# Patient Record
Sex: MALE | Race: BLACK OR AFRICAN AMERICAN | NOT HISPANIC OR LATINO | Employment: OTHER | URBAN - METROPOLITAN AREA
[De-identification: names, ages, dates, MRNs, and addresses within clinical notes are randomized per-mention and may not be internally consistent; named-entity substitution may affect disease eponyms.]

---

## 2017-10-24 ENCOUNTER — TRANSCRIBE ORDERS (OUTPATIENT)
Dept: ADMINISTRATIVE | Facility: HOSPITAL | Age: 67
End: 2017-10-24

## 2017-10-24 ENCOUNTER — GENERIC CONVERSION - ENCOUNTER (OUTPATIENT)
Dept: OTHER | Facility: OTHER | Age: 67
End: 2017-10-24

## 2017-10-24 DIAGNOSIS — N28.89 OTHER SPECIFIED DISORDERS OF KIDNEY AND URETER: ICD-10-CM

## 2017-10-24 DIAGNOSIS — R31.0 GROSS HEMATURIA: ICD-10-CM

## 2017-10-24 DIAGNOSIS — R10.9 ABDOMINAL PAIN: ICD-10-CM

## 2017-10-24 DIAGNOSIS — R10.9 STOMACH ACHE: Primary | ICD-10-CM

## 2017-10-30 ENCOUNTER — HOSPITAL ENCOUNTER (OUTPATIENT)
Dept: CT IMAGING | Facility: HOSPITAL | Age: 67
Discharge: HOME | End: 2017-10-30
Attending: UROLOGY
Payer: COMMERCIAL

## 2017-10-30 DIAGNOSIS — R31.0 GROSS HEMATURIA: ICD-10-CM

## 2017-10-30 DIAGNOSIS — R10.9 ABDOMINAL PAIN: ICD-10-CM

## 2017-10-30 PROCEDURE — 74176 CT ABD & PELVIS W/O CONTRAST: CPT

## 2017-11-07 ENCOUNTER — HOSPITAL ENCOUNTER (OUTPATIENT)
Dept: ULTRASOUND IMAGING | Facility: HOSPITAL | Age: 67
Discharge: HOME | End: 2017-11-07
Attending: UROLOGY
Payer: COMMERCIAL

## 2017-11-07 DIAGNOSIS — N28.89 OTHER SPECIFIED DISORDERS OF KIDNEY AND URETER: ICD-10-CM

## 2017-11-07 PROCEDURE — 76770 US EXAM ABDO BACK WALL COMP: CPT

## 2018-01-22 VITALS
DIASTOLIC BLOOD PRESSURE: 80 MMHG | BODY MASS INDEX: 32.73 KG/M2 | HEIGHT: 69 IN | WEIGHT: 221 LBS | SYSTOLIC BLOOD PRESSURE: 120 MMHG

## 2018-06-25 ENCOUNTER — TELEPHONE (OUTPATIENT)
Dept: UROLOGY | Facility: MEDICAL CENTER | Age: 68
End: 2018-06-25

## 2018-06-25 DIAGNOSIS — N13.8 ENLARGED PROSTATE WITH URINARY OBSTRUCTION: Primary | ICD-10-CM

## 2018-06-25 DIAGNOSIS — N40.1 ENLARGED PROSTATE WITH URINARY OBSTRUCTION: Primary | ICD-10-CM

## 2018-06-26 RX ORDER — FEXOFENADINE HCL 180 MG/1
TABLET ORAL
Refills: 3 | COMMUNITY
Start: 2018-05-28

## 2018-06-26 RX ORDER — PANTOPRAZOLE SODIUM 40 MG/1
40 TABLET, DELAYED RELEASE ORAL 2 TIMES DAILY
Refills: 1 | COMMUNITY
Start: 2018-04-12

## 2018-06-26 RX ORDER — ATORVASTATIN CALCIUM 20 MG/1
20 TABLET, FILM COATED ORAL 3 TIMES WEEKLY
Refills: 1 | COMMUNITY
Start: 2018-05-11

## 2018-06-26 RX ORDER — GABAPENTIN 300 MG/1
300 CAPSULE ORAL
COMMUNITY

## 2018-06-26 RX ORDER — CITALOPRAM 10 MG/1
10 TABLET ORAL DAILY
Refills: 0 | COMMUNITY
Start: 2018-06-16

## 2018-06-26 RX ORDER — ERGOCALCIFEROL (VITAMIN D2) 10 MCG
1 TABLET ORAL DAILY
COMMUNITY

## 2018-06-26 RX ORDER — CLOPIDOGREL BISULFATE 75 MG/1
TABLET ORAL
COMMUNITY
Start: 2017-08-22

## 2018-06-26 RX ORDER — ALBUTEROL SULFATE 2.5 MG/3ML
SOLUTION RESPIRATORY (INHALATION)
Refills: 11 | COMMUNITY
Start: 2018-03-19

## 2018-06-26 RX ORDER — VALSARTAN 80 MG
80 TABLET ORAL DAILY
Refills: 0 | COMMUNITY
Start: 2018-06-06

## 2018-06-26 RX ORDER — ZOLPIDEM TARTRATE 5 MG/1
TABLET ORAL
Refills: 1 | COMMUNITY
Start: 2018-05-22

## 2018-06-26 RX ORDER — TRAMADOL HYDROCHLORIDE 50 MG/1
TABLET ORAL
Refills: 2 | COMMUNITY
Start: 2018-04-09

## 2018-06-28 ENCOUNTER — OFFICE VISIT (OUTPATIENT)
Dept: UROLOGY | Facility: MEDICAL CENTER | Age: 68
End: 2018-06-28
Payer: COMMERCIAL

## 2018-06-28 VITALS
WEIGHT: 214 LBS | DIASTOLIC BLOOD PRESSURE: 80 MMHG | SYSTOLIC BLOOD PRESSURE: 140 MMHG | HEIGHT: 69 IN | BODY MASS INDEX: 31.7 KG/M2

## 2018-06-28 DIAGNOSIS — N13.8 ENLARGED PROSTATE WITH URINARY OBSTRUCTION: Primary | ICD-10-CM

## 2018-06-28 DIAGNOSIS — Z12.5 ENCOUNTER FOR PROSTATE CANCER SCREENING: ICD-10-CM

## 2018-06-28 DIAGNOSIS — N40.1 ENLARGED PROSTATE WITH URINARY OBSTRUCTION: Primary | ICD-10-CM

## 2018-06-28 DIAGNOSIS — N52.2 DRUG-INDUCED ERECTILE DYSFUNCTION: ICD-10-CM

## 2018-06-28 LAB
SL AMB  POCT GLUCOSE, UA: NEGATIVE
SL AMB LEUKOCYTE ESTERASE,UA: NEGATIVE
SL AMB POCT BILIRUBIN,UA: NEGATIVE
SL AMB POCT BLOOD,UA: NEGATIVE
SL AMB POCT CLARITY,UA: CLEAR
SL AMB POCT COLOR,UA: YELLOW
SL AMB POCT KETONES,UA: NEGATIVE
SL AMB POCT NITRITE,UA: NEGATIVE
SL AMB POCT PH,UA: 5.5
SL AMB POCT SPECIFIC GRAVITY,UA: 1.02
SL AMB POCT URINE PROTEIN: NEGATIVE
SL AMB POCT UROBILINOGEN: 0.2

## 2018-06-28 PROCEDURE — 81003 URINALYSIS AUTO W/O SCOPE: CPT | Performed by: UROLOGY

## 2018-06-28 PROCEDURE — 99213 OFFICE O/P EST LOW 20 MIN: CPT | Performed by: UROLOGY

## 2018-06-28 RX ORDER — SILDENAFIL CITRATE 20 MG/1
20 TABLET ORAL DAILY PRN
Qty: 90 TABLET | Refills: 3 | Status: SHIPPED | OUTPATIENT
Start: 2018-06-28

## 2018-06-28 RX ORDER — FLUTICASONE PROPIONATE 50 MCG
SPRAY, SUSPENSION (ML) NASAL
COMMUNITY
Start: 2018-06-20

## 2018-06-28 NOTE — LETTER
2018     Melania CaceresDO irene  [facility] [id number]    Patient: Sayda Miller   YOB: 1950   Date of Visit: 2018       Dear Dr Gertrude Ramirez: Thank you for referring Alexx Serrato to me for evaluation  Below are my notes for this consultation  If you have questions, please do not hesitate to call me  I look forward to following your patient along with you  Sincerely,        Arun Birmingham MD        CC: No Recipients  Arun Birmingham MD  2018 11:16 AM  Sign at close encounter  [address] [facility]  [address], [address] [place] [address]  [phone]  www  [facility]  org      NAME: Devon Tena  AGE: 77 y o  SEX: male  : 1950   MRN: [id number]    DATE: 2018  TIME: 11:03 AM    Assessment and Plan:  Gross hematuria, which has resolved  Negative workup  BPH with obstruction, no symptoms presently  Doing well  Follow-up 1 year with PSA  Chief Complaint   No chief complaint on file  History of Present Illness   F/U gross hematuria no further episodes  , BPH with obstruction  S/P cysto  which showed friable prostate  PSA 1 4  US of abdomen showed normal kidneys 3/2018  Voiding well without complaints  Has had recent weight loss which we cannot really explain  The following portions of the patient's history were reviewed and updated as appropriate: allergies, current medications, past family history, past medical history, past social history, past surgical history and problem list     Review of Systems   Review of Systems   Constitutional: Positive for unexpected weight change  Active Problem List   There is no problem list on file for this patient  Objective   There were no vitals taken for this visit  Physical Exam   Constitutional: He is oriented to person, place, and time  He appears well-developed and well-nourished     HENT: Head: Normocephalic and atraumatic  Eyes: EOM are normal    Neck: Normal range of motion  Pulmonary/Chest: Effort normal    Genitourinary: Rectum normal and prostate normal    Genitourinary Comments: Prostate is about 40 g, smooth symmetric and benign  No nodules  Rectal exam reveals normal tone no masses  Musculoskeletal: Normal range of motion  Neurological: He is alert and oriented to person, place, and time  Skin: Skin is warm and dry  Psychiatric: He has a normal mood and affect  His behavior is normal  Judgment and thought content normal            Current Medications     Current Outpatient Prescriptions:     clopidogrel (PLAVIX) 75 mg tablet, TAKE 1 TABLET DAILY **PLEASE CALL FOR APPOINTMENT, Disp: , Rfl:     albuterol (2 5 mg/3 mL) 0 083 % nebulizer solution, TAKE 3 ML (2 5 MG TOTAL) BY NEBULIZATION EVERY 4 (FOUR) HOURS AS NEEDED FOR WHEEZING , Disp: , Rfl: 11    aspirin 81 MG tablet, Take 1 tablet by mouth daily, Disp: , Rfl:     atorvastatin (LIPITOR) 20 mg tablet, Take 20 mg by mouth 3 (three) times a week, Disp: , Rfl: 1    Cholecalciferol 5000 units TABS, Take by mouth, Disp: , Rfl:     citalopram (CeleXA) 10 mg tablet, Take 10 mg by mouth daily, Disp: , Rfl: 0    DEXILANT 30 MG capsule, Take 1 capsule by mouth daily, Disp: , Rfl: 6    DIOVAN 80 MG tablet, Take 80 mg by mouth daily, Disp: , Rfl: 0    fexofenadine (ALLEGRA) 180 MG tablet, TAKE 1 TABLET (180 MG TOTAL) BY MOUTH DAILY  , Disp: , Rfl: 3    gabapentin (NEURONTIN) 300 mg capsule, Take 300 mg by mouth, Disp: , Rfl:     metFORMIN (GLUCOPHAGE) 500 mg tablet, Take 1 tablet by mouth 2 (two) times a day, Disp: , Rfl:     Multiple Vitamin (DAILY VALUE MULTIVITAMIN) TABS, Take 1 tablet by mouth daily, Disp: , Rfl:     pantoprazole (PROTONIX) 40 mg tablet, Take 40 mg by mouth 2 (two) times a day, Disp: , Rfl: 1    traMADol (ULTRAM) 50 mg tablet, TAKE 1 TABLET EVERY 8 HOURS AS NEEDED FOR MODERATE PAIN, Disp: , Rfl: 2   zolpidem (AMBIEN) 5 mg tablet, TAKE 1 TABLET DAILY AS NEEDED FOR SLEEP , Disp: , Rfl: 1        Kirk Infante MD

## 2018-06-28 NOTE — PROGRESS NOTES
[address] [facility]  [facility]  [address] [facility]  [place], [address] [facility]  [phone]  www  [facility]  org      NAME: Devon Tena  AGE: 77 y o  SEX: male  : 1950   MRN: [id number]    DATE: 2018  TIME: 11:03 AM    Assessment and Plan:  Gross hematuria, which has resolved  Negative workup  BPH with obstruction, no symptoms presently  Doing well  Follow-up 1 year with PSA  ED:  Will prescribe sildenafil generic  Chief Complaint   No chief complaint on file  History of Present Illness   F/U gross hematuria no further episodes  , BPH with obstruction  S/P cysto  which showed friable prostate  PSA 1 4  US of abdomen showed normal kidneys 3/2018  Voiding well without complaints  Has had recent weight loss which we cannot really explain  Erectile dysfunction: This is due to his use of Celexa  The following portions of the patient's history were reviewed and updated as appropriate: allergies, current medications, past family history, past medical history, past social history, past surgical history and problem list     Review of Systems   Review of Systems   Constitutional: Positive for unexpected weight change  Active Problem List   There is no problem list on file for this patient  Objective   There were no vitals taken for this visit  Physical Exam   Constitutional: He is oriented to person, place, and time  He appears well-developed and well-nourished  HENT:   Head: Normocephalic and atraumatic  Eyes: EOM are normal    Neck: Normal range of motion  Pulmonary/Chest: Effort normal    Genitourinary: Rectum normal and prostate normal    Genitourinary Comments: Prostate is about 40 g, smooth symmetric and benign  No nodules  Rectal exam reveals normal tone no masses  Musculoskeletal: Normal range of motion  Neurological: He is alert and oriented to person, place, and time     Skin: Skin is warm and dry    Psychiatric: He has a normal mood and affect  His behavior is normal  Judgment and thought content normal            Current Medications     Current Outpatient Prescriptions:     clopidogrel (PLAVIX) 75 mg tablet, TAKE 1 TABLET DAILY **PLEASE CALL FOR APPOINTMENT, Disp: , Rfl:     albuterol (2 5 mg/3 mL) 0 083 % nebulizer solution, TAKE 3 ML (2 5 MG TOTAL) BY NEBULIZATION EVERY 4 (FOUR) HOURS AS NEEDED FOR WHEEZING , Disp: , Rfl: 11    aspirin 81 MG tablet, Take 1 tablet by mouth daily, Disp: , Rfl:     atorvastatin (LIPITOR) 20 mg tablet, Take 20 mg by mouth 3 (three) times a week, Disp: , Rfl: 1    Cholecalciferol 5000 units TABS, Take by mouth, Disp: , Rfl:     citalopram (CeleXA) 10 mg tablet, Take 10 mg by mouth daily, Disp: , Rfl: 0    DEXILANT 30 MG capsule, Take 1 capsule by mouth daily, Disp: , Rfl: 6    DIOVAN 80 MG tablet, Take 80 mg by mouth daily, Disp: , Rfl: 0    fexofenadine (ALLEGRA) 180 MG tablet, TAKE 1 TABLET (180 MG TOTAL) BY MOUTH DAILY  , Disp: , Rfl: 3    gabapentin (NEURONTIN) 300 mg capsule, Take 300 mg by mouth, Disp: , Rfl:     metFORMIN (GLUCOPHAGE) 500 mg tablet, Take 1 tablet by mouth 2 (two) times a day, Disp: , Rfl:     Multiple Vitamin (DAILY VALUE MULTIVITAMIN) TABS, Take 1 tablet by mouth daily, Disp: , Rfl:     pantoprazole (PROTONIX) 40 mg tablet, Take 40 mg by mouth 2 (two) times a day, Disp: , Rfl: 1    traMADol (ULTRAM) 50 mg tablet, TAKE 1 TABLET EVERY 8 HOURS AS NEEDED FOR MODERATE PAIN, Disp: , Rfl: 2    zolpidem (AMBIEN) 5 mg tablet, TAKE 1 TABLET DAILY AS NEEDED FOR SLEEP , Disp: , Rfl: 1        Sunil Caballero MD

## 2019-02-01 RX ORDER — VENLAFAXINE HYDROCHLORIDE 75 MG/1
TABLET, EXTENDED RELEASE ORAL
Refills: 3 | COMMUNITY
Start: 2018-12-19

## 2019-02-01 RX ORDER — TIOTROPIUM BROMIDE INHALATION SPRAY 3.12 UG/1
2 SPRAY, METERED RESPIRATORY (INHALATION) DAILY
Refills: 11 | COMMUNITY
Start: 2019-01-22

## 2019-02-04 ENCOUNTER — OFFICE VISIT (OUTPATIENT)
Dept: UROLOGY | Facility: MEDICAL CENTER | Age: 69
End: 2019-02-04
Payer: COMMERCIAL

## 2019-02-04 VITALS
SYSTOLIC BLOOD PRESSURE: 130 MMHG | WEIGHT: 209 LBS | HEART RATE: 97 BPM | DIASTOLIC BLOOD PRESSURE: 70 MMHG | BODY MASS INDEX: 30.96 KG/M2 | HEIGHT: 69 IN

## 2019-02-04 DIAGNOSIS — R39.15 URGENCY OF URINATION: ICD-10-CM

## 2019-02-04 DIAGNOSIS — Z12.5 ENCOUNTER FOR PROSTATE CANCER SCREENING: ICD-10-CM

## 2019-02-04 DIAGNOSIS — N13.8 ENLARGED PROSTATE WITH URINARY OBSTRUCTION: Primary | ICD-10-CM

## 2019-02-04 DIAGNOSIS — N40.1 ENLARGED PROSTATE WITH URINARY OBSTRUCTION: Primary | ICD-10-CM

## 2019-02-04 DIAGNOSIS — R35.1 NOCTURIA: ICD-10-CM

## 2019-02-04 LAB
SL AMB  POCT GLUCOSE, UA: NORMAL
SL AMB LEUKOCYTE ESTERASE,UA: NORMAL
SL AMB POCT BILIRUBIN,UA: NORMAL
SL AMB POCT BLOOD,UA: NORMAL
SL AMB POCT CLARITY,UA: CLEAR
SL AMB POCT COLOR,UA: YELLOW
SL AMB POCT KETONES,UA: NORMAL
SL AMB POCT NITRITE,UA: NORMAL
SL AMB POCT PH,UA: 5
SL AMB POCT SPECIFIC GRAVITY,UA: 1.02
SL AMB POCT URINE PROTEIN: NORMAL
SL AMB POCT UROBILINOGEN: 0.2

## 2019-02-04 PROCEDURE — 81003 URINALYSIS AUTO W/O SCOPE: CPT | Performed by: UROLOGY

## 2019-02-04 PROCEDURE — 99213 OFFICE O/P EST LOW 20 MIN: CPT | Performed by: UROLOGY

## 2019-02-04 RX ORDER — LATANOPROST 50 UG/ML
SOLUTION/ DROPS OPHTHALMIC
COMMUNITY

## 2019-02-04 RX ORDER — ACETAMINOPHEN 500 MG
500 TABLET ORAL EVERY 6 HOURS PRN
COMMUNITY

## 2019-02-04 RX ORDER — TAMSULOSIN HYDROCHLORIDE 0.4 MG/1
0.4 CAPSULE ORAL
Qty: 30 CAPSULE | Refills: 11 | Status: SHIPPED | OUTPATIENT
Start: 2019-02-04 | End: 2020-07-31 | Stop reason: ALTCHOICE

## 2019-02-04 RX ORDER — LANOLIN ALCOHOL/MO/W.PET/CERES
1.5 CREAM (GRAM) TOPICAL
COMMUNITY

## 2019-02-04 NOTE — PROGRESS NOTES
[address] [facility]  [facility]  [address] [facility]  [place], [address] [facility]  [phone]  www  [facility]  org      NAME: Devon Tena  AGE: 77 y o  SEX: male  : 1950   MRN: [id number]    DATE: 2019  TIME: 8:55 AM    Assessment and Plan:    BPH with obstruction, with relatively new onset of symptoms  The BPH has been approved by cystoscopy in   We discussed natural remedies or starting Flomax  We will try Flomax 0 4 mg p o  Q h s  To see if that cut down getting up at night and improves his stream   Proscar also may be a future option given that he has friable vessels on his prostate  Follow-up in 6 months as scheduled  Chief Complaint   No chief complaint on file  History of Present Illness   He complains of weakness of stream, hesitancy and intermittency and increased nocturia 3 times a night since November  No severe constipation, no new medications have been started that might be contributing to this  History of BPH with obstruction, gross hematuria and ED  Last seen by me 2018  He was scheduled to follow-up in 1 year  PSA was 1 4 at that time- 1 25 3/2017, 0 96 3/2014  Slow rise over the years, not a true velocity  The following portions of the patient's history were reviewed and updated as appropriate: allergies, current medications, past family history, past medical history, past social history, past surgical history and problem list     Review of Systems   Review of Systems   Genitourinary: Negative for difficulty urinating and frequency  Active Problem List   There is no problem list on file for this patient  Objective   There were no vitals taken for this visit      Physical Exam        Current Medications     Current Outpatient Prescriptions:     albuterol (2 5 mg/3 mL) 0 083 % nebulizer solution, TAKE 3 ML (2 5 MG TOTAL) BY NEBULIZATION EVERY 4 (FOUR) HOURS AS NEEDED FOR WHEEZING , Disp: , Rfl: 11    aspirin 81 MG tablet, Take 1 tablet by mouth daily, Disp: , Rfl:     atorvastatin (LIPITOR) 20 mg tablet, Take 20 mg by mouth 3 (three) times a week, Disp: , Rfl: 1    Cholecalciferol 5000 units TABS, Take by mouth, Disp: , Rfl:     citalopram (CeleXA) 10 mg tablet, Take 10 mg by mouth daily, Disp: , Rfl: 0    clopidogrel (PLAVIX) 75 mg tablet, TAKE 1 TABLET DAILY **PLEASE CALL FOR APPOINTMENT, Disp: , Rfl:     DEXILANT 30 MG capsule, Take 1 capsule by mouth daily, Disp: , Rfl: 6    DIOVAN 80 MG tablet, Take 80 mg by mouth daily, Disp: , Rfl: 0    fexofenadine (ALLEGRA) 180 MG tablet, TAKE 1 TABLET (180 MG TOTAL) BY MOUTH DAILY  , Disp: , Rfl: 3    fluticasone (FLONASE) 50 mcg/act nasal spray, , Disp: , Rfl:     gabapentin (NEURONTIN) 300 mg capsule, Take 300 mg by mouth, Disp: , Rfl:     metFORMIN (GLUCOPHAGE) 500 mg tablet, Take 1 tablet by mouth 2 (two) times a day, Disp: , Rfl:     Multiple Vitamin (DAILY VALUE MULTIVITAMIN) TABS, Take 1 tablet by mouth daily, Disp: , Rfl:     pantoprazole (PROTONIX) 40 mg tablet, Take 40 mg by mouth 2 (two) times a day, Disp: , Rfl: 1    sildenafil (REVATIO) 20 mg tablet, Take 1 tablet (20 mg total) by mouth daily as needed (As directed) Take 1-5 pills 1-2 hours before each episode, Disp: 90 tablet, Rfl: 3    SPIRIVA RESPIMAT 2 5 MCG/ACT AERS inhaler, Inhale 2 puffs daily, Disp: , Rfl: 11    traMADol (ULTRAM) 50 mg tablet, TAKE 1 TABLET EVERY 8 HOURS AS NEEDED FOR MODERATE PAIN, Disp: , Rfl: 2    venlafaxine 75 mg 24 hr tablet, TAKE 1 TABLET (75 MG TOTAL) BY MOUTH DAILY WITH BREAKFAST , Disp: , Rfl: 3    zolpidem (AMBIEN) 5 mg tablet, TAKE 1 TABLET DAILY AS NEEDED FOR SLEEP , Disp: , Rfl: 1        Christian Goldstein MD

## 2019-02-04 NOTE — LETTER
2019     David Benson DO  [facility] 2 [id number]    Patient: Dory Mendez   YOB: 1950   Date of Visit: 2019       Dear Dr Rosendo Starr: Thank you for referring Nhung Ordaz to me for evaluation  Below are my notes for this consultation  If you have questions, please do not hesitate to call me  I look forward to following your patient along with you  Sincerely,        Consuelo Yun MD        CC: No Recipients  Consuelo Yun MD  2019 10:03 AM  Sign at close encounter  [address] [facility]  [address], [address] [place] [id number]  www  [facility]  org      NAME: Devon Tena  AGE: 77 y o  SEX: male  : 1950   MRN: [id number]    DATE: 2019  TIME: 8:55 AM    Assessment and Plan:    BPH with obstruction, with relatively new onset of symptoms  The BPH has been approved by cystoscopy in   We discussed natural remedies or starting Flomax  We will try Flomax 0 4 mg p o  Q h s  To see if that cut down getting up at night and improves his stream   Proscar also may be a future option given that he has friable vessels on his prostate  Follow-up in 6 months as scheduled  Chief Complaint   No chief complaint on file  History of Present Illness   He complains of weakness of stream, hesitancy and intermittency and increased nocturia 3 times a night since November  No severe constipation, no new medications have been started that might be contributing to this  History of BPH with obstruction, gross hematuria and ED  Last seen by me 2018  He was scheduled to follow-up in 1 year  PSA was 1 4 at that time- 1 25 3/2017, 0 96 3/2014  Slow rise over the years, not a true velocity        The following portions of the patient's history were reviewed and updated as appropriate: allergies, current medications, past family history, past medical history, past social history, past surgical history and problem list     Review of Systems   Review of Systems   Genitourinary: Negative for difficulty urinating and frequency  Active Problem List   There is no problem list on file for this patient  Objective   There were no vitals taken for this visit  Physical Exam        Current Medications     Current Outpatient Prescriptions:     albuterol (2 5 mg/3 mL) 0 083 % nebulizer solution, TAKE 3 ML (2 5 MG TOTAL) BY NEBULIZATION EVERY 4 (FOUR) HOURS AS NEEDED FOR WHEEZING , Disp: , Rfl: 11    aspirin 81 MG tablet, Take 1 tablet by mouth daily, Disp: , Rfl:     atorvastatin (LIPITOR) 20 mg tablet, Take 20 mg by mouth 3 (three) times a week, Disp: , Rfl: 1    Cholecalciferol 5000 units TABS, Take by mouth, Disp: , Rfl:     citalopram (CeleXA) 10 mg tablet, Take 10 mg by mouth daily, Disp: , Rfl: 0    clopidogrel (PLAVIX) 75 mg tablet, TAKE 1 TABLET DAILY **PLEASE CALL FOR APPOINTMENT, Disp: , Rfl:     DEXILANT 30 MG capsule, Take 1 capsule by mouth daily, Disp: , Rfl: 6    DIOVAN 80 MG tablet, Take 80 mg by mouth daily, Disp: , Rfl: 0    fexofenadine (ALLEGRA) 180 MG tablet, TAKE 1 TABLET (180 MG TOTAL) BY MOUTH DAILY  , Disp: , Rfl: 3    fluticasone (FLONASE) 50 mcg/act nasal spray, , Disp: , Rfl:     gabapentin (NEURONTIN) 300 mg capsule, Take 300 mg by mouth, Disp: , Rfl:     metFORMIN (GLUCOPHAGE) 500 mg tablet, Take 1 tablet by mouth 2 (two) times a day, Disp: , Rfl:     Multiple Vitamin (DAILY VALUE MULTIVITAMIN) TABS, Take 1 tablet by mouth daily, Disp: , Rfl:     pantoprazole (PROTONIX) 40 mg tablet, Take 40 mg by mouth 2 (two) times a day, Disp: , Rfl: 1    sildenafil (REVATIO) 20 mg tablet, Take 1 tablet (20 mg total) by mouth daily as needed (As directed) Take 1-5 pills 1-2 hours before each episode, Disp: 90 tablet, Rfl: 3    SPIRIVA RESPIMAT 2 5 MCG/ACT AERS inhaler, Inhale 2 puffs daily, Disp: , Rfl: 11    traMADol (ULTRAM) 50 mg tablet, TAKE 1 TABLET EVERY 8 HOURS AS NEEDED FOR MODERATE PAIN, Disp: , Rfl: 2    venlafaxine 75 mg 24 hr tablet, TAKE 1 TABLET (75 MG TOTAL) BY MOUTH DAILY WITH BREAKFAST , Disp: , Rfl: 3    zolpidem (AMBIEN) 5 mg tablet, TAKE 1 TABLET DAILY AS NEEDED FOR SLEEP , Disp: , Rfl: 1        Chris Huddleston MD

## 2019-06-06 RX ORDER — MELOXICAM 7.5 MG/1
TABLET ORAL
COMMUNITY

## 2019-06-06 RX ORDER — SIMVASTATIN 10 MG
TABLET ORAL
COMMUNITY

## 2019-06-06 RX ORDER — MONTELUKAST SODIUM 10 MG/1
TABLET ORAL
COMMUNITY

## 2019-06-06 RX ORDER — METAXALONE 800 MG/1
TABLET ORAL EVERY 8 HOURS
COMMUNITY

## 2019-06-06 RX ORDER — CELECOXIB 200 MG/1
CAPSULE ORAL
COMMUNITY

## 2019-06-06 RX ORDER — BETAXOLOL HYDROCHLORIDE 5 MG/ML
SOLUTION/ DROPS OPHTHALMIC
COMMUNITY

## 2019-06-06 RX ORDER — NABUMETONE 750 MG/1
TABLET, FILM COATED ORAL EVERY 12 HOURS
COMMUNITY

## 2019-06-06 RX ORDER — TADALAFIL 5 MG/1
TABLET ORAL
COMMUNITY

## 2019-06-06 RX ORDER — VALSARTAN AND HYDROCHLOROTHIAZIDE 80; 12.5 MG/1; MG/1
TABLET, FILM COATED ORAL
COMMUNITY

## 2019-06-06 RX ORDER — LOSARTAN POTASSIUM 25 MG/1
TABLET ORAL
COMMUNITY

## 2019-06-06 RX ORDER — BRIMONIDINE TARTRATE 0.15 %
DROPS OPHTHALMIC (EYE)
COMMUNITY

## 2019-06-06 RX ORDER — IPRATROPIUM BROMIDE 42 UG/1
SPRAY, METERED NASAL
COMMUNITY

## 2019-06-06 RX ORDER — IBUPROFEN 600 MG/1
TABLET ORAL
COMMUNITY

## 2019-06-06 RX ORDER — AMITRIPTYLINE HYDROCHLORIDE 50 MG/1
TABLET, FILM COATED ORAL
COMMUNITY

## 2019-06-06 RX ORDER — EZETIMIBE 10 MG/1
TABLET ORAL
COMMUNITY

## 2019-06-10 ENCOUNTER — TELEPHONE (OUTPATIENT)
Dept: UROLOGY | Facility: AMBULATORY SURGERY CENTER | Age: 69
End: 2019-06-10

## 2019-07-03 LAB — PSA SERPL-MCNC: 1.7 NG/ML

## 2019-07-08 ENCOUNTER — OFFICE VISIT (OUTPATIENT)
Dept: UROLOGY | Facility: MEDICAL CENTER | Age: 69
End: 2019-07-08
Payer: COMMERCIAL

## 2019-07-08 VITALS
HEART RATE: 82 BPM | SYSTOLIC BLOOD PRESSURE: 122 MMHG | DIASTOLIC BLOOD PRESSURE: 80 MMHG | HEIGHT: 69 IN | WEIGHT: 208 LBS | BODY MASS INDEX: 30.81 KG/M2

## 2019-07-08 DIAGNOSIS — N13.8 ENLARGED PROSTATE WITH URINARY OBSTRUCTION: Primary | ICD-10-CM

## 2019-07-08 DIAGNOSIS — Z12.5 ENCOUNTER FOR PROSTATE CANCER SCREENING: ICD-10-CM

## 2019-07-08 DIAGNOSIS — N40.1 ENLARGED PROSTATE WITH URINARY OBSTRUCTION: Primary | ICD-10-CM

## 2019-07-08 LAB
SL AMB  POCT GLUCOSE, UA: NORMAL
SL AMB LEUKOCYTE ESTERASE,UA: NORMAL
SL AMB POCT BILIRUBIN,UA: NORMAL
SL AMB POCT BLOOD,UA: NORMAL
SL AMB POCT CLARITY,UA: CLEAR
SL AMB POCT COLOR,UA: YELLOW
SL AMB POCT KETONES,UA: NORMAL
SL AMB POCT NITRITE,UA: NORMAL
SL AMB POCT PH,UA: 5.5
SL AMB POCT SPECIFIC GRAVITY,UA: 1.02
SL AMB POCT URINE PROTEIN: NORMAL
SL AMB POCT UROBILINOGEN: 0.2

## 2019-07-08 PROCEDURE — 81003 URINALYSIS AUTO W/O SCOPE: CPT | Performed by: UROLOGY

## 2019-07-08 PROCEDURE — 99214 OFFICE O/P EST MOD 30 MIN: CPT | Performed by: UROLOGY

## 2019-07-08 RX ORDER — FINASTERIDE 5 MG/1
5 TABLET, FILM COATED ORAL DAILY
Qty: 30 TABLET | Refills: 11 | Status: SHIPPED | OUTPATIENT
Start: 2019-07-08 | End: 2020-07-31 | Stop reason: SDUPTHER

## 2019-07-08 NOTE — PROGRESS NOTES
[address] [facility]  [facility]  [address] [facility]  [place], [address] [facility]  [phone]  www  [facility]  org      NAME: Devon Tena  AGE: 68 y o  SEX: male  : 1950   MRN: [id number]    DATE: 2019  TIME: 11:34 AM    Assessment and Plan:    BPH with obstruction: We will start him on Proscar  The risks of sexual dysfunction and breast tenderness were explained  Encounter for prostate cancer screening:  Recheck PSA in 1 year  Chief Complaint   No chief complaint on file  History of Present Illness   Follow-up history of BPH with obstruction with hesitancy and weakness of stream and intermittency and 3 time a night nocturia-was started on Flomax 0 4mg qhs  Has large prostate with friable vessels  PSA 1 7 2019  The Flomax helped but he had to stop it due to stomach problems  We discussed other options such as Proscar and uro lift  My recommendation would be Proscar because it would reduce any future bleeding episodes and shrink the prostate  The following portions of the patient's history were reviewed and updated as appropriate: allergies, current medications, past family history, past medical history, past social history, past surgical history and problem list   Past Medical History:   Diagnosis Date    Bladder pain     BPH with obstruction/lower urinary tract symptoms     BPH without obstruction/lower urinary tract symptoms     Erectile dysfunction     Frequency of urination     Gross hematuria     Interstitial cystitis     Interstitial cystitis (chronic) with hematuria      Past Surgical History:   Procedure Laterality Date    HERNIA REPAIR       shoulder  Review of Systems   Review of Systems    Active Problem List   There is no problem list on file for this patient        Objective   /80   Pulse 82   Ht 5' 9" (1 753 m)   Wt 94 3 kg (208 lb)   BMI 30 72 kg/m²     Physical Exam   Constitutional: He is oriented to person, place, and time  He appears well-developed and well-nourished  HENT:   Head: Normocephalic and atraumatic  Eyes: EOM are normal    Neck: Normal range of motion  Pulmonary/Chest: Effort normal    Genitourinary: Rectum normal and prostate normal    Genitourinary Comments: Rectal exam reveals normal tone, no masses and his prostate is about 50-60 g, smooth symmetric and benign  No nodules  Musculoskeletal: Normal range of motion  Neurological: He is alert and oriented to person, place, and time  Skin: Skin is warm and dry  Psychiatric: He has a normal mood and affect  His behavior is normal  Judgment and thought content normal            Current Medications     Current Outpatient Medications:     acetaminophen (TYLENOL) 500 mg tablet, Take 500 mg by mouth every 6 (six) hours as needed, Disp: , Rfl:     albuterol (2 5 mg/3 mL) 0 083 % nebulizer solution, TAKE 3 ML (2 5 MG TOTAL) BY NEBULIZATION EVERY 4 (FOUR) HOURS AS NEEDED FOR WHEEZING , Disp: , Rfl: 11    aspirin 81 MG tablet, Take 1 tablet by mouth daily, Disp: , Rfl:     atorvastatin (LIPITOR) 20 mg tablet, Take 20 mg by mouth 3 (three) times a week, Disp: , Rfl: 1    Cholecalciferol 5000 units TABS, Take by mouth, Disp: , Rfl:     clopidogrel (PLAVIX) 75 mg tablet, TAKE 1 TABLET DAILY **PLEASE CALL FOR APPOINTMENT, Disp: , Rfl:     DEXILANT 30 MG capsule, Take 1 capsule by mouth daily, Disp: , Rfl: 6    DIOVAN 80 MG tablet, Take 80 mg by mouth daily, Disp: , Rfl: 0    fexofenadine (ALLEGRA) 180 MG tablet, TAKE 1 TABLET (180 MG TOTAL) BY MOUTH DAILY  , Disp: , Rfl: 3    fluconazole (DIFLUCAN) 50 mg tablet, Take by mouth, Disp: , Rfl:     fluticasone (FLONASE) 50 mcg/act nasal spray, , Disp: , Rfl:     gabapentin (NEURONTIN) 300 mg capsule, Take 300 mg by mouth, Disp: , Rfl:     ipratropium (ATROVENT) 0 06 % nasal spray, ipratropium bromide 42 mcg (0 06 %) nasal spray  USE 1-2 SPRAYS IN EACH NOSTRIL UPTO 4 TIMES A DAY, Disp: , Rfl:     latanoprost (XALATAN) 0 005 % ophthalmic solution, INSTILL 1 DROP INTO BOTH EYES AT BEDTIME, Disp: , Rfl:     melatonin 3 mg, Take 1 5 mg by mouth, Disp: , Rfl:     metFORMIN (GLUCOPHAGE) 500 mg tablet, Take 1 tablet by mouth 2 (two) times a day, Disp: , Rfl:     Multiple Vitamin (DAILY VALUE MULTIVITAMIN) TABS, Take 1 tablet by mouth daily, Disp: , Rfl:     simvastatin (ZOCOR) 10 mg tablet, simvastatin 10 mg tablet, Disp: , Rfl:     SPIRIVA RESPIMAT 2 5 MCG/ACT AERS inhaler, Inhale 2 puffs daily, Disp: , Rfl: 11    tamsulosin (FLOMAX) 0 4 mg, Take 1 capsule (0 4 mg total) by mouth daily with dinner, Disp: 30 capsule, Rfl: 11    traMADol (ULTRAM) 50 mg tablet, TAKE 1 TABLET EVERY 8 HOURS AS NEEDED FOR MODERATE PAIN, Disp: , Rfl: 2    zolpidem (AMBIEN) 5 mg tablet, TAKE 1 TABLET DAILY AS NEEDED FOR SLEEP , Disp: , Rfl: 1    amitriptyline (ELAVIL) 50 mg tablet, amitriptyline 50 mg tablet  TAKE 1 TABLET DAILY IN THE EVENING, Disp: , Rfl:     ascorbic acid (VITAMIN C) 1500 MG TBCR, Take by mouth, Disp: , Rfl:     betaxolol (BETOPTIC) 0 5 % ophthalmic solution, betaxolol 0 5 % eye drops, Disp: , Rfl:     bimatoprost (LUMIGAN) 0 01 % ophthalmic drops, Lumigan 0 01 % eye drops, Disp: , Rfl:     brimonidine (ALPHAGAN P) 0 15 % ophthalmic solution, brimonidine 0 15 % eye drops, Disp: , Rfl:     celecoxib (CELEBREX) 200 mg capsule, Celebrex 200 mg capsule, Disp: , Rfl:     citalopram (CeleXA) 10 mg tablet, Take 10 mg by mouth daily, Disp: , Rfl: 0    ezetimibe (ZETIA) 10 mg tablet, Zetia 10 mg tablet, Disp: , Rfl:     FISH OIL-CHOLECALCIFEROL PO, Take by mouth, Disp: , Rfl:     ibuprofen (MOTRIN) 600 mg tablet, ibuprofen 600 mg tablet  TAKE 1 TABLET EVERY 6 HOURS AS NEEDED FOR MILD,PAIN (PAIN SCORE 1-3), Disp: , Rfl:     Loratadine 10 MG CAPS, Take by mouth, Disp: , Rfl:     losartan (COZAAR) 25 mg tablet, losartan 25 mg tablet  TAKE 1 TABLET DAILY, Disp: , Rfl:     meloxicam (MOBIC) 7 5 mg tablet, meloxicam 7 5 mg tablet  TAKE 1 TABLET EVERY DAY, Disp: , Rfl:     metaxalone (SKELAXIN) 800 mg tablet, every 8 (eight) hours, Disp: , Rfl:     montelukast (SINGULAIR) 10 mg tablet, Singulair 10 mg tablet, Disp: , Rfl:     nabumetone (RELAFEN) 750 mg tablet, Every 12 hours, Disp: , Rfl:     pantoprazole (PROTONIX) 40 mg tablet, Take 40 mg by mouth 2 (two) times a day, Disp: , Rfl: 1    sildenafil (REVATIO) 20 mg tablet, Take 1 tablet (20 mg total) by mouth daily as needed (As directed) Take 1-5 pills 1-2 hours before each episode (Patient not taking: Reported on 2/4/2019 ), Disp: 90 tablet, Rfl: 3    tadalafil (CIALIS) 5 MG tablet, Cialis 5 mg tablet  TK 1 T PO QD UTD, Disp: , Rfl:     valsartan-hydrochlorothiazide (DIOVAN HCT) 80-12 5 MG per tablet, Diovan HCT 80 mg-12 5 mg tablet, Disp: , Rfl:     venlafaxine 75 mg 24 hr tablet, TAKE 1 TABLET (75 MG TOTAL) BY MOUTH DAILY WITH BREAKFAST , Disp: , Rfl: 3        Cynthia Reed MD

## 2020-07-31 ENCOUNTER — OFFICE VISIT (OUTPATIENT)
Dept: UROLOGY | Facility: MEDICAL CENTER | Age: 70
End: 2020-07-31
Payer: COMMERCIAL

## 2020-07-31 VITALS
SYSTOLIC BLOOD PRESSURE: 132 MMHG | TEMPERATURE: 97.2 F | WEIGHT: 200 LBS | HEIGHT: 69 IN | BODY MASS INDEX: 29.62 KG/M2 | DIASTOLIC BLOOD PRESSURE: 80 MMHG

## 2020-07-31 DIAGNOSIS — R97.20 RISING PSA LEVEL: Primary | ICD-10-CM

## 2020-07-31 DIAGNOSIS — N13.8 ENLARGED PROSTATE WITH URINARY OBSTRUCTION: ICD-10-CM

## 2020-07-31 DIAGNOSIS — N40.1 ENLARGED PROSTATE WITH URINARY OBSTRUCTION: ICD-10-CM

## 2020-07-31 PROCEDURE — 99214 OFFICE O/P EST MOD 30 MIN: CPT | Performed by: UROLOGY

## 2020-07-31 RX ORDER — FINASTERIDE 5 MG/1
5 TABLET, FILM COATED ORAL DAILY
Qty: 90 TABLET | Refills: 3 | Status: SHIPPED | OUTPATIENT
Start: 2020-07-31 | End: 2021-02-23

## 2020-07-31 NOTE — PROGRESS NOTES
[address] [facility]  [facility]  [address] [facility]  [place], [address] [facility]  [phone]  www  [facility]  org      NAME: Devon Tena  AGE: 65 y o  SEX: male  : 1950   MRN: [id number]    DATE: 2020  TIME: 10:58 AM    Assessment and Plan :    BPH with obstruction:  Good response to Proscar in terms of his symptoms  Continue this indefinitely  He cannot take Flomax due to stomach issues  Rising PSA:  PSA should have dropped in the past year but it is very slowly rising  We will recheck this in 6 months and if it continues to rise high may consider doing an MRI of the prostate  Chief Complaint     Chief Complaint   Patient presents with    Benign Prostatic Hypertrophy       History of Present Illness    Follow-up BPH with obstruction and hesitancy weakness of stream and intermittency and 3 time per night nocturia and he was started previously on Flomax 0 4 mg p o  Q h s but he had to stop this due to stomach issues     He has a large prostate with friable vessels  PSA was 1 7 1 year ago, and is 2 06 as of 2020  He continues with the finasteride is nocturia down to twice a night and overall he is happy with his voiding pattern  He does describe a little bit of pain with bladder filling which he had last year  Rising PSA:  His PSA has not dropped as expected since starting the Proscar last year  It is now 2 06, and it was 0 96 in 2014 and it showed a very slow steady rise      The following portions of the patient's history were reviewed and updated as appropriate: allergies, current medications, past family history, past medical history, past social history, past surgical history and problem list   Past Medical History:   Diagnosis Date    Bladder pain     BPH with obstruction/lower urinary tract symptoms     BPH without obstruction/lower urinary tract symptoms     Erectile dysfunction     Frequency of urination     Gross hematuria     Interstitial cystitis     Interstitial cystitis (chronic) with hematuria      Past Surgical History:   Procedure Laterality Date    HERNIA REPAIR  1989     shoulder  Review of Systems   Review of Systems   Constitutional: Positive for fatigue  Genitourinary: Negative  Active Problem List   There is no problem list on file for this patient  Objective   /80   Ht 5' 9" (1 753 m)   Wt 90 7 kg (200 lb)   BMI 29 53 kg/m²     Physical Exam   Constitutional: He is oriented to person, place, and time  He appears well-developed and well-nourished  HENT:   Head: Normocephalic and atraumatic  Eyes: EOM are normal    Neck: Normal range of motion  Pulmonary/Chest: Effort normal    Genitourinary: Rectum normal and prostate normal    Genitourinary Comments: Rectal exam reveals normal tone, no masses and his prostate is about 40 g, smooth symmetric benign  No nodules  Musculoskeletal: Normal range of motion  Neurological: He is alert and oriented to person, place, and time  Psychiatric: He has a normal mood and affect   His behavior is normal  Judgment and thought content normal            Current Medications     Current Outpatient Medications:     acetaminophen (TYLENOL) 500 mg tablet, Take 500 mg by mouth every 6 (six) hours as needed, Disp: , Rfl:     albuterol (2 5 mg/3 mL) 0 083 % nebulizer solution, TAKE 3 ML (2 5 MG TOTAL) BY NEBULIZATION EVERY 4 (FOUR) HOURS AS NEEDED FOR WHEEZING , Disp: , Rfl: 11    amitriptyline (ELAVIL) 50 mg tablet, amitriptyline 50 mg tablet  TAKE 1 TABLET DAILY IN THE EVENING, Disp: , Rfl:     ascorbic acid (VITAMIN C) 1500 MG TBCR, Take by mouth, Disp: , Rfl:     aspirin 81 MG tablet, Take 1 tablet by mouth daily, Disp: , Rfl:     atorvastatin (LIPITOR) 20 mg tablet, Take 20 mg by mouth 3 (three) times a week, Disp: , Rfl: 1    betaxolol (BETOPTIC) 0 5 % ophthalmic solution, betaxolol 0 5 % eye drops, Disp: , Rfl:     bimatoprost (LUMIGAN) 0 01 % ophthalmic drops, Lumigan 0 01 % eye drops, Disp: , Rfl:     brimonidine (ALPHAGAN P) 0 15 % ophthalmic solution, brimonidine 0 15 % eye drops, Disp: , Rfl:     celecoxib (CELEBREX) 200 mg capsule, Celebrex 200 mg capsule, Disp: , Rfl:     Cholecalciferol 5000 units TABS, Take by mouth, Disp: , Rfl:     citalopram (CeleXA) 10 mg tablet, Take 10 mg by mouth daily, Disp: , Rfl: 0    clopidogrel (PLAVIX) 75 mg tablet, TAKE 1 TABLET DAILY **PLEASE CALL FOR APPOINTMENT, Disp: , Rfl:     DEXILANT 30 MG capsule, Take 1 capsule by mouth daily, Disp: , Rfl: 6    DIOVAN 80 MG tablet, Take 80 mg by mouth daily, Disp: , Rfl: 0    ezetimibe (ZETIA) 10 mg tablet, Zetia 10 mg tablet, Disp: , Rfl:     fexofenadine (ALLEGRA) 180 MG tablet, TAKE 1 TABLET (180 MG TOTAL) BY MOUTH DAILY  , Disp: , Rfl: 3    finasteride (PROSCAR) 5 mg tablet, Take 1 tablet (5 mg total) by mouth daily, Disp: 30 tablet, Rfl: 11    FISH OIL-CHOLECALCIFEROL PO, Take by mouth, Disp: , Rfl:     fluconazole (DIFLUCAN) 50 mg tablet, Take by mouth, Disp: , Rfl:     fluticasone (FLONASE) 50 mcg/act nasal spray, , Disp: , Rfl:     gabapentin (NEURONTIN) 300 mg capsule, Take 300 mg by mouth, Disp: , Rfl:     ibuprofen (MOTRIN) 600 mg tablet, ibuprofen 600 mg tablet  TAKE 1 TABLET EVERY 6 HOURS AS NEEDED FOR MILD,PAIN (PAIN SCORE 1-3), Disp: , Rfl:     ipratropium (ATROVENT) 0 06 % nasal spray, ipratropium bromide 42 mcg (0 06 %) nasal spray  USE 1-2 SPRAYS IN EACH NOSTRIL UPTO 4 TIMES A DAY, Disp: , Rfl:     latanoprost (XALATAN) 0 005 % ophthalmic solution, INSTILL 1 DROP INTO BOTH EYES AT BEDTIME, Disp: , Rfl:     Loratadine 10 MG CAPS, Take by mouth, Disp: , Rfl:     losartan (COZAAR) 25 mg tablet, losartan 25 mg tablet  TAKE 1 TABLET DAILY, Disp: , Rfl:     melatonin 3 mg, Take 1 5 mg by mouth, Disp: , Rfl:     meloxicam (MOBIC) 7 5 mg tablet, meloxicam 7 5 mg tablet  TAKE 1 TABLET EVERY DAY, Disp: , Rfl:     metaxalone (SKELAXIN) 800 mg tablet, every 8 (eight) hours, Disp: , Rfl:     metFORMIN (GLUCOPHAGE) 500 mg tablet, Take 1 tablet by mouth 2 (two) times a day, Disp: , Rfl:     montelukast (SINGULAIR) 10 mg tablet, Singulair 10 mg tablet, Disp: , Rfl:     Multiple Vitamin (DAILY VALUE MULTIVITAMIN) TABS, Take 1 tablet by mouth daily, Disp: , Rfl:     nabumetone (RELAFEN) 750 mg tablet, Every 12 hours, Disp: , Rfl:     pantoprazole (PROTONIX) 40 mg tablet, Take 40 mg by mouth 2 (two) times a day, Disp: , Rfl: 1    sildenafil (REVATIO) 20 mg tablet, Take 1 tablet (20 mg total) by mouth daily as needed (As directed) Take 1-5 pills 1-2 hours before each episode, Disp: 90 tablet, Rfl: 3    simvastatin (ZOCOR) 10 mg tablet, simvastatin 10 mg tablet, Disp: , Rfl:     SPIRIVA RESPIMAT 2 5 MCG/ACT AERS inhaler, Inhale 2 puffs daily, Disp: , Rfl: 11    tadalafil (CIALIS) 5 MG tablet, Cialis 5 mg tablet  TK 1 T PO QD UTD, Disp: , Rfl:     tamsulosin (FLOMAX) 0 4 mg, Take 1 capsule (0 4 mg total) by mouth daily with dinner, Disp: 30 capsule, Rfl: 11    traMADol (ULTRAM) 50 mg tablet, TAKE 1 TABLET EVERY 8 HOURS AS NEEDED FOR MODERATE PAIN, Disp: , Rfl: 2    valsartan-hydrochlorothiazide (DIOVAN HCT) 80-12 5 MG per tablet, Diovan HCT 80 mg-12 5 mg tablet, Disp: , Rfl:     venlafaxine 75 mg 24 hr tablet, TAKE 1 TABLET (75 MG TOTAL) BY MOUTH DAILY WITH BREAKFAST , Disp: , Rfl: 3    zolpidem (AMBIEN) 5 mg tablet, TAKE 1 TABLET DAILY AS NEEDED FOR SLEEP , Disp: , Rfl: 1        Sherryle Alice, MD

## 2021-02-23 ENCOUNTER — OFFICE VISIT (OUTPATIENT)
Dept: UROLOGY | Facility: MEDICAL CENTER | Age: 71
End: 2021-02-23
Payer: COMMERCIAL

## 2021-02-23 VITALS — WEIGHT: 196 LBS | HEIGHT: 69 IN | BODY MASS INDEX: 29.03 KG/M2

## 2021-02-23 DIAGNOSIS — Z12.5 ENCOUNTER FOR PROSTATE CANCER SCREENING: Primary | ICD-10-CM

## 2021-02-23 DIAGNOSIS — N40.1 ENLARGED PROSTATE WITH URINARY OBSTRUCTION: ICD-10-CM

## 2021-02-23 DIAGNOSIS — N13.8 ENLARGED PROSTATE WITH URINARY OBSTRUCTION: ICD-10-CM

## 2021-02-23 PROCEDURE — 99213 OFFICE O/P EST LOW 20 MIN: CPT | Performed by: UROLOGY

## 2021-02-23 RX ORDER — HYDROXYZINE HYDROCHLORIDE 10 MG/1
TABLET, FILM COATED ORAL
COMMUNITY
Start: 2020-12-16

## 2021-02-23 RX ORDER — METHOCARBAMOL 750 MG/1
TABLET, FILM COATED ORAL
COMMUNITY

## 2021-02-23 RX ORDER — SUCRALFATE ORAL 1 G/10ML
SUSPENSION ORAL
COMMUNITY

## 2021-02-23 RX ORDER — TRAZODONE HYDROCHLORIDE 50 MG/1
TABLET ORAL
COMMUNITY
Start: 2021-01-16

## 2021-02-23 NOTE — PROGRESS NOTES
[address] [facility]  [facility]  [address] [facility], [address] [place] [address]  [phone]  www  [facility]  org      NAME: Devon Tena  AGE: 65 y o  SEX: male  : 1950   MRN: [id number]    DATE: 2021  TIME: 10:35 AM    Assessment and Plan:    PSA elevation, which has actually decreased now  We can check this yearly  BPH with obstruction with large friable prostate:  Doing very well in the Proscar  Again he cannot take Flomax due to stomach issues  However he does not need this in currently does not need any other form of therapy  Follow-up 1 year with PSA  Chief Complaint   No chief complaint on file  History of Present Illness   BPH with obstruction follow-up:  On Proscar  Cannot take Flomax due to stomach issues  He has a large friable prostate seen on cystoscopy  Is voiding better and is nocturia done 1 time a night  Rising PSA:  When I last saw him 2020 his PSA and not dropped as expected with Proscar and it was 2 06  He is here for recheck of this  It has dropped to 1 87  2021  The following portions of the patient's history were reviewed and updated as appropriate: allergies, current medications, past family history, past medical history, past social history, past surgical history and problem list   Past Medical History:   Diagnosis Date    Bladder pain     BPH with obstruction/lower urinary tract symptoms     BPH without obstruction/lower urinary tract symptoms     Erectile dysfunction     Frequency of urination     Gross hematuria     Interstitial cystitis     Interstitial cystitis (chronic) with hematuria      Past Surgical History:   Procedure Laterality Date    HERNIA REPAIR       shoulder  Review of Systems   Review of Systems   Constitutional: Negative for fever  Respiratory: Negative for shortness of breath  Cardiovascular: Negative for chest pain     Genitourinary: Negative  Active Problem List   There is no problem list on file for this patient  Objective   Ht 5' 9" (1 753 m)   Wt 88 9 kg (196 lb)   BMI 28 94 kg/m²     Physical Exam  Constitutional:       Appearance: Normal appearance  HENT:      Head: Normocephalic and atraumatic  Eyes:      Extraocular Movements: Extraocular movements intact  Neck:      Musculoskeletal: Normal range of motion  Pulmonary:      Effort: Pulmonary effort is normal    Musculoskeletal: Normal range of motion  Skin:     Coloration: Skin is not jaundiced or pale  Neurological:      General: No focal deficit present  Mental Status: He is alert and oriented to person, place, and time  Psychiatric:         Mood and Affect: Mood normal          Behavior: Behavior normal          Thought Content:  Thought content normal          Judgment: Judgment normal              Current Medications     Current Outpatient Medications:     acetaminophen (TYLENOL) 500 mg tablet, Take 500 mg by mouth every 6 (six) hours as needed, Disp: , Rfl:     albuterol (2 5 mg/3 mL) 0 083 % nebulizer solution, TAKE 3 ML (2 5 MG TOTAL) BY NEBULIZATION EVERY 4 (FOUR) HOURS AS NEEDED FOR WHEEZING , Disp: , Rfl: 11    ascorbic acid (VITAMIN C) 1500 MG TBCR, Take by mouth, Disp: , Rfl:     aspirin 81 MG tablet, Take 1 tablet by mouth daily, Disp: , Rfl:     atorvastatin (LIPITOR) 20 mg tablet, Take 20 mg by mouth 3 (three) times a week, Disp: , Rfl: 1    betaxolol (BETOPTIC) 0 5 % ophthalmic solution, betaxolol 0 5 % eye drops, Disp: , Rfl:     bimatoprost (LUMIGAN) 0 01 % ophthalmic drops, Lumigan 0 01 % eye drops, Disp: , Rfl:     brimonidine (ALPHAGAN P) 0 15 % ophthalmic solution, brimonidine 0 15 % eye drops, Disp: , Rfl:     Cholecalciferol 5000 units TABS, Take by mouth, Disp: , Rfl:     clopidogrel (PLAVIX) 75 mg tablet, TAKE 1 TABLET DAILY **PLEASE CALL FOR APPOINTMENT, Disp: , Rfl:     DEXILANT 30 MG capsule, Take 1 capsule by mouth daily, Disp: , Rfl: 6    Diclofenac Sodium (VOLTAREN) 1 %, diclofenac 1 % topical gel  prn, Disp: , Rfl:     DIOVAN 80 MG tablet, Take 80 mg by mouth daily, Disp: , Rfl: 0    ezetimibe (ZETIA) 10 mg tablet, Zetia 10 mg tablet, Disp: , Rfl:     fexofenadine (ALLEGRA) 180 MG tablet, TAKE 1 TABLET (180 MG TOTAL) BY MOUTH DAILY  , Disp: , Rfl: 3    finasteride (PROSCAR) 5 mg tablet, Take 1 tablet (5 mg total) by mouth daily, Disp: 90 tablet, Rfl: 3    FISH OIL-CHOLECALCIFEROL PO, Take by mouth, Disp: , Rfl:     fluticasone (FLONASE) 50 mcg/act nasal spray, , Disp: , Rfl:     gabapentin (NEURONTIN) 300 mg capsule, Take 300 mg by mouth, Disp: , Rfl:     hydrOXYzine HCL (ATARAX) 10 mg tablet, TAKE ONE TABLET BY MOUTH RIGHT BEFORE BEDTIME, Disp: , Rfl:     ibuprofen (MOTRIN) 600 mg tablet, ibuprofen 600 mg tablet  TAKE 1 TABLET EVERY 6 HOURS AS NEEDED FOR MILD,PAIN (PAIN SCORE 1-3), Disp: , Rfl:     ipratropium (ATROVENT) 0 06 % nasal spray, ipratropium bromide 42 mcg (0 06 %) nasal spray  USE 1-2 SPRAYS IN EACH NOSTRIL UPTO 4 TIMES A DAY, Disp: , Rfl:     latanoprost (XALATAN) 0 005 % ophthalmic solution, INSTILL 1 DROP INTO BOTH EYES AT BEDTIME, Disp: , Rfl:     Loratadine 10 MG CAPS, Take by mouth, Disp: , Rfl:     losartan (COZAAR) 25 mg tablet, losartan 25 mg tablet  TAKE 1 TABLET DAILY, Disp: , Rfl:     metFORMIN (GLUCOPHAGE) 500 mg tablet, Take 1 tablet by mouth 2 (two) times a day, Disp: , Rfl:     methocarbamol (ROBAXIN) 750 mg tablet, methocarbamol 750 mg tablet  TAKE 1 TABLET BY MOUTH EVERY 6 TO 8 HOURS AS NEEDED, Disp: , Rfl:     montelukast (SINGULAIR) 10 mg tablet, Singulair 10 mg tablet, Disp: , Rfl:     Multiple Vitamin (DAILY VALUE MULTIVITAMIN) TABS, Take 1 tablet by mouth daily, Disp: , Rfl:     nabumetone (RELAFEN) 750 mg tablet, Every 12 hours, Disp: , Rfl:     pantoprazole (PROTONIX) 40 mg tablet, Take 40 mg by mouth 2 (two) times a day, Disp: , Rfl: 1    sildenafil (REVATIO) 20 mg tablet, Take 1 tablet (20 mg total) by mouth daily as needed (As directed) Take 1-5 pills 1-2 hours before each episode, Disp: 90 tablet, Rfl: 3    simvastatin (ZOCOR) 10 mg tablet, simvastatin 10 mg tablet, Disp: , Rfl:     SPIRIVA RESPIMAT 2 5 MCG/ACT AERS inhaler, Inhale 2 puffs daily, Disp: , Rfl: 11    sucralfate (CARAFATE) 1 g/10 mL suspension, sucralfate 100 mg/mL oral suspension, Disp: , Rfl:     traMADol (ULTRAM) 50 mg tablet, TAKE 1 TABLET EVERY 8 HOURS AS NEEDED FOR MODERATE PAIN, Disp: , Rfl: 2    traZODone (DESYREL) 50 mg tablet, TAKE 1 TABLET BY MOUTH EVERY DAY AT NIGHT, Disp: , Rfl:     valsartan-hydrochlorothiazide (DIOVAN HCT) 80-12 5 MG per tablet, Diovan HCT 80 mg-12 5 mg tablet, Disp: , Rfl:     venlafaxine 75 mg 24 hr tablet, TAKE 1 TABLET (75 MG TOTAL) BY MOUTH DAILY WITH BREAKFAST , Disp: , Rfl: 3    amitriptyline (ELAVIL) 50 mg tablet, amitriptyline 50 mg tablet  TAKE 1 TABLET DAILY IN THE EVENING, Disp: , Rfl:     celecoxib (CELEBREX) 200 mg capsule, Celebrex 200 mg capsule, Disp: , Rfl:     citalopram (CeleXA) 10 mg tablet, Take 10 mg by mouth daily, Disp: , Rfl: 0    fluconazole (DIFLUCAN) 50 mg tablet, Take by mouth, Disp: , Rfl:     melatonin 3 mg, Take 1 5 mg by mouth, Disp: , Rfl:     meloxicam (MOBIC) 7 5 mg tablet, meloxicam 7 5 mg tablet  TAKE 1 TABLET EVERY DAY, Disp: , Rfl:     metaxalone (SKELAXIN) 800 mg tablet, every 8 (eight) hours, Disp: , Rfl:     tadalafil (CIALIS) 5 MG tablet, Cialis 5 mg tablet  TK 1 T PO QD UTD, Disp: , Rfl:     zolpidem (AMBIEN) 5 mg tablet, TAKE 1 TABLET DAILY AS NEEDED FOR SLEEP , Disp: , Rfl: 1        Darren Shetty MD

## 2022-03-15 ENCOUNTER — TELEPHONE (OUTPATIENT)
Dept: UROLOGY | Facility: MEDICAL CENTER | Age: 72
End: 2022-03-15

## 2022-03-15 NOTE — TELEPHONE ENCOUNTER
Called pt to micaela his appt with Dr Jannie Lee for 3/22/22  Left voice mail for patient to call back and reschedule his appt with Dr Jannie Lee  Told him how far out Dr Jannie Lee is here and other locations as well as other providers he would be able to see sooner

## 2022-04-25 ENCOUNTER — OFFICE VISIT (OUTPATIENT)
Dept: UROLOGY | Facility: MEDICAL CENTER | Age: 72
End: 2022-04-25
Payer: COMMERCIAL

## 2022-04-25 VITALS
HEIGHT: 69 IN | WEIGHT: 203 LBS | SYSTOLIC BLOOD PRESSURE: 128 MMHG | BODY MASS INDEX: 30.07 KG/M2 | DIASTOLIC BLOOD PRESSURE: 78 MMHG | HEART RATE: 76 BPM

## 2022-04-25 DIAGNOSIS — N13.8 ENLARGED PROSTATE WITH URINARY OBSTRUCTION: Primary | ICD-10-CM

## 2022-04-25 DIAGNOSIS — R97.20 ELEVATED PROSTATE SPECIFIC ANTIGEN (PSA): ICD-10-CM

## 2022-04-25 DIAGNOSIS — N40.1 ENLARGED PROSTATE WITH URINARY OBSTRUCTION: Primary | ICD-10-CM

## 2022-04-25 PROCEDURE — 99214 OFFICE O/P EST MOD 30 MIN: CPT | Performed by: UROLOGY

## 2022-04-25 RX ORDER — METOPROLOL SUCCINATE 25 MG/1
TABLET, EXTENDED RELEASE ORAL
COMMUNITY
Start: 2022-01-31

## 2022-04-25 RX ORDER — MIRTAZAPINE 15 MG/1
TABLET, FILM COATED ORAL
COMMUNITY
Start: 2022-02-04

## 2022-04-25 RX ORDER — CHLORHEXIDINE GLUCONATE 0.12 MG/ML
RINSE ORAL
COMMUNITY
Start: 2022-03-25

## 2022-04-25 RX ORDER — ALFUZOSIN HYDROCHLORIDE 10 MG/1
10 TABLET, EXTENDED RELEASE ORAL DAILY
Qty: 90 TABLET | Refills: 3 | Status: SHIPPED | OUTPATIENT
Start: 2022-04-25

## 2022-04-25 NOTE — PROGRESS NOTES
HISTORY:    Follow-up BPH, he is no longer taking Flomax, and he stopped finasteride because of concern for effect on erectile function    He is also on Effexor, he says that decreases orgasm sensation and constricts his flow  on Proscar  Cysto in the past showed a quite large prostate with friable mucosa  Nocturia times 1-2, no gross hematuria infections    PSA 3 3 in March 2022  1 8 in February 2021  2 0 in July 2020  1 7 in July 2019       For increasing BPH  ASSESSMENT / PLAN:    1  Start alfuzosin, side effects outlined  2  PSA slight rise the past year, will check again in six months  3  Follow-up one year  4  Cysto did show quite large prostate in the past, we discussed procedures that are available    The following portions of the patient's history were reviewed and updated as appropriate: allergies, current medications, past family history, past medical history, past social history, past surgical history and problem list     Review of Systems   All other systems reviewed and are negative  Objective:     Physical Exam  Constitutional:       General: He is not in acute distress  Appearance: He is well-developed  He is not diaphoretic  HENT:      Head: Normocephalic and atraumatic  Eyes:      General: No scleral icterus  Pulmonary:      Effort: Pulmonary effort is normal    Genitourinary:     Comments: Penis testes normal    Prostate mildly enlarged irregular no nodules  Skin:     Coloration: Skin is not pale  Neurological:      Mental Status: He is alert and oriented to person, place, and time  Psychiatric:         Behavior: Behavior normal          Thought Content: Thought content normal          Judgment: Judgment normal            0   Lab Value Date/Time    PSA 1 7 07/02/2019 0926   ]  No results found for: BUN  No results found for: CREATININE  No components found for: CBC      There is no problem list on file for this patient         Diagnoses and all orders for this visit:    Enlarged prostate with urinary obstruction  -     alfuzosin (UROXATRAL) 10 mg 24 hr tablet; Take 1 tablet (10 mg total) by mouth daily    Elevated prostate specific antigen (PSA)  -     PSA Total, Diagnostic; Future    Other orders  -     metoprolol succinate (TOPROL-XL) 25 mg 24 hr tablet; TAKE 1 TABLET (25 MG TOTAL) BY MOUTH DAILY  -     chlorhexidine (PERIDEX) 0 12 % solution; RINSE MOUTH WITH 15ML (1 CAPFUL) FOR 30 SECONDS IN MORNING AND EVENING AFTER BRUSHING, THEN SPIT  -     mirtazapine (REMERON) 15 mg tablet; TAKE 1 TABLET BY MOUTH EVERY DAY AT NIGHT (Patient not taking: Reported on 4/25/2022)           Patient ID: Thalia Sever is a 66 y o  male  Current Outpatient Medications:     acetaminophen (TYLENOL) 500 mg tablet, Take 500 mg by mouth every 6 (six) hours as needed, Disp: , Rfl:     albuterol (2 5 mg/3 mL) 0 083 % nebulizer solution, TAKE 3 ML (2 5 MG TOTAL) BY NEBULIZATION EVERY 4 (FOUR) HOURS AS NEEDED FOR WHEEZING , Disp: , Rfl: 11    amitriptyline (ELAVIL) 50 mg tablet, amitriptyline 50 mg tablet  TAKE 1 TABLET DAILY IN THE EVENING, Disp: , Rfl:     ascorbic acid (VITAMIN C) 1500 MG TBCR, Take by mouth, Disp: , Rfl:     atorvastatin (LIPITOR) 20 mg tablet, Take 20 mg by mouth 3 (three) times a week, Disp: , Rfl: 1    betaxolol (BETOPTIC) 0 5 % ophthalmic solution, betaxolol 0 5 % eye drops, Disp: , Rfl:     chlorhexidine (PERIDEX) 0 12 % solution, RINSE MOUTH WITH 15ML (1 CAPFUL) FOR 30 SECONDS IN MORNING AND EVENING AFTER BRUSHING, THEN SPIT, Disp: , Rfl:     Cholecalciferol 5000 units TABS, Take by mouth, Disp: , Rfl:     clopidogrel (PLAVIX) 75 mg tablet, TAKE 1 TABLET DAILY **PLEASE CALL FOR APPOINTMENT, Disp: , Rfl:     DIOVAN 80 MG tablet, Take 80 mg by mouth daily, Disp: , Rfl: 0    fexofenadine (ALLEGRA) 180 MG tablet, TAKE 1 TABLET (180 MG TOTAL) BY MOUTH DAILY  , Disp: , Rfl: 3    fluticasone (FLONASE) 50 mcg/act nasal spray, , Disp: , Rfl:     gabapentin (NEURONTIN) 300 mg capsule, Take 300 mg by mouth, Disp: , Rfl:     ipratropium (ATROVENT) 0 06 % nasal spray, ipratropium bromide 42 mcg (0 06 %) nasal spray  USE 1-2 SPRAYS IN EACH NOSTRIL UPTO 4 TIMES A DAY, Disp: , Rfl:     Loratadine 10 MG CAPS, Take by mouth, Disp: , Rfl:     losartan (COZAAR) 25 mg tablet, losartan 25 mg tablet  TAKE 1 TABLET DAILY, Disp: , Rfl:     metFORMIN (GLUCOPHAGE) 500 mg tablet, Take 1 tablet by mouth 2 (two) times a day, Disp: , Rfl:     metoprolol succinate (TOPROL-XL) 25 mg 24 hr tablet, TAKE 1 TABLET (25 MG TOTAL) BY MOUTH DAILY  , Disp: , Rfl:     Multiple Vitamin (DAILY VALUE MULTIVITAMIN) TABS, Take 1 tablet by mouth daily, Disp: , Rfl:     traMADol (ULTRAM) 50 mg tablet, TAKE 1 TABLET EVERY 8 HOURS AS NEEDED FOR MODERATE PAIN, Disp: , Rfl: 2    valsartan-hydrochlorothiazide (DIOVAN HCT) 80-12 5 MG per tablet, Diovan HCT 80 mg-12 5 mg tablet, Disp: , Rfl:     aspirin 81 MG tablet, Take 1 tablet by mouth daily (Patient not taking: Reported on 4/25/2022 ), Disp: , Rfl:     bimatoprost (LUMIGAN) 0 01 % ophthalmic drops, Lumigan 0 01 % eye drops (Patient not taking: Reported on 4/25/2022), Disp: , Rfl:     brimonidine (ALPHAGAN P) 0 15 % ophthalmic solution, brimonidine 0 15 % eye drops (Patient not taking: Reported on 4/25/2022), Disp: , Rfl:     celecoxib (CELEBREX) 200 mg capsule, Celebrex 200 mg capsule (Patient not taking: Reported on 4/25/2022), Disp: , Rfl:     citalopram (CeleXA) 10 mg tablet, Take 10 mg by mouth daily (Patient not taking: Reported on 4/25/2022 ), Disp: , Rfl: 0    DEXILANT 30 MG capsule, Take 1 capsule by mouth daily, Disp: , Rfl: 6    Diclofenac Sodium (VOLTAREN) 1 %, diclofenac 1 % topical gel  prn, Disp: , Rfl:     ezetimibe (ZETIA) 10 mg tablet, Zetia 10 mg tablet (Patient not taking: Reported on 4/25/2022), Disp: , Rfl:     finasteride (PROSCAR) 5 mg tablet, Take 1 tablet (5 mg total) by mouth daily, Disp: 90 tablet, Rfl: 3    FISH OIL-CHOLECALCIFEROL PO, Take by mouth (Patient not taking: Reported on 4/25/2022 ), Disp: , Rfl:     fluconazole (DIFLUCAN) 50 mg tablet, Take by mouth (Patient not taking: Reported on 4/25/2022 ), Disp: , Rfl:     hydrOXYzine HCL (ATARAX) 10 mg tablet, TAKE ONE TABLET BY MOUTH RIGHT BEFORE BEDTIME (Patient not taking: Reported on 4/25/2022), Disp: , Rfl:     ibuprofen (MOTRIN) 600 mg tablet, ibuprofen 600 mg tablet  TAKE 1 TABLET EVERY 6 HOURS AS NEEDED FOR MILD,PAIN (PAIN SCORE 1-3) (Patient not taking: Reported on 4/25/2022), Disp: , Rfl:     latanoprost (XALATAN) 0 005 % ophthalmic solution, INSTILL 1 DROP INTO BOTH EYES AT BEDTIME (Patient not taking: Reported on 4/25/2022), Disp: , Rfl:     melatonin 3 mg, Take 1 5 mg by mouth (Patient not taking: Reported on 4/25/2022 ), Disp: , Rfl:     meloxicam (MOBIC) 7 5 mg tablet, meloxicam 7 5 mg tablet  TAKE 1 TABLET EVERY DAY (Patient not taking: Reported on 4/25/2022), Disp: , Rfl:     metaxalone (SKELAXIN) 800 mg tablet, every 8 (eight) hours (Patient not taking: Reported on 4/25/2022 ), Disp: , Rfl:     methocarbamol (ROBAXIN) 750 mg tablet, methocarbamol 750 mg tablet  TAKE 1 TABLET BY MOUTH EVERY 6 TO 8 HOURS AS NEEDED (Patient not taking: Reported on 4/25/2022), Disp: , Rfl:     mirtazapine (REMERON) 15 mg tablet, TAKE 1 TABLET BY MOUTH EVERY DAY AT NIGHT (Patient not taking: Reported on 4/25/2022), Disp: , Rfl:     montelukast (SINGULAIR) 10 mg tablet, Singulair 10 mg tablet (Patient not taking: Reported on 4/25/2022), Disp: , Rfl:     nabumetone (RELAFEN) 750 mg tablet, Every 12 hours (Patient not taking: Reported on 4/25/2022 ), Disp: , Rfl:     pantoprazole (PROTONIX) 40 mg tablet, Take 40 mg by mouth 2 (two) times a day (Patient not taking: Reported on 4/25/2022 ), Disp: , Rfl: 1    sildenafil (REVATIO) 20 mg tablet, Take 1 tablet (20 mg total) by mouth daily as needed (As directed) Take 1-5 pills 1-2 hours before each episode (Patient not taking: Reported on 4/25/2022 ), Disp: 90 tablet, Rfl: 3    simvastatin (ZOCOR) 10 mg tablet, simvastatin 10 mg tablet (Patient not taking: Reported on 4/25/2022), Disp: , Rfl:     SPIRIVA RESPIMAT 2 5 MCG/ACT AERS inhaler, Inhale 2 puffs daily (Patient not taking: Reported on 4/25/2022 ), Disp: , Rfl: 11    sucralfate (CARAFATE) 1 g/10 mL suspension, sucralfate 100 mg/mL oral suspension, Disp: , Rfl:     tadalafil (CIALIS) 5 MG tablet, Cialis 5 mg tablet  TK 1 T PO QD UTD (Patient not taking: Reported on 4/25/2022), Disp: , Rfl:     traZODone (DESYREL) 50 mg tablet, TAKE 1 TABLET BY MOUTH EVERY DAY AT NIGHT (Patient not taking: Reported on 4/25/2022), Disp: , Rfl:     venlafaxine 75 mg 24 hr tablet, TAKE 1 TABLET (75 MG TOTAL) BY MOUTH DAILY WITH BREAKFAST  (Patient not taking: Reported on 4/25/2022), Disp: , Rfl: 3    zolpidem (AMBIEN) 5 mg tablet, TAKE 1 TABLET DAILY AS NEEDED FOR SLEEP   (Patient not taking: Reported on 4/25/2022), Disp: , Rfl: 1    Past Medical History:   Diagnosis Date    Bladder pain     BPH with obstruction/lower urinary tract symptoms     BPH without obstruction/lower urinary tract symptoms     Erectile dysfunction     Frequency of urination     Gross hematuria     Interstitial cystitis     Interstitial cystitis (chronic) with hematuria        Past Surgical History:   Procedure Laterality Date   [facility]       Social History

## 2023-02-16 ENCOUNTER — OFFICE VISIT (OUTPATIENT)
Dept: UROLOGY | Facility: MEDICAL CENTER | Age: 73
End: 2023-02-16

## 2023-02-16 VITALS
SYSTOLIC BLOOD PRESSURE: 112 MMHG | BODY MASS INDEX: 30.81 KG/M2 | HEART RATE: 86 BPM | OXYGEN SATURATION: 94 % | HEIGHT: 69 IN | DIASTOLIC BLOOD PRESSURE: 70 MMHG | WEIGHT: 208 LBS

## 2023-02-16 DIAGNOSIS — N20.0 CALCULUS OF KIDNEY: Primary | ICD-10-CM

## 2023-02-16 DIAGNOSIS — R10.9 FLANK PAIN: ICD-10-CM

## 2023-02-16 RX ORDER — SODIUM PICOSULFATE, MAGNESIUM OXIDE, AND ANHYDROUS CITRIC ACID 10; 3.5; 12 MG/160ML; G/160ML; G/160ML
LIQUID ORAL
COMMUNITY
Start: 2023-01-23

## 2023-02-16 RX ORDER — ISOSORBIDE MONONITRATE 30 MG/1
60 TABLET, EXTENDED RELEASE ORAL DAILY
COMMUNITY
Start: 2023-01-12

## 2023-02-16 NOTE — PROGRESS NOTES
HISTORY:    #1 follow-up BPH, doing well with that, he is not taking any prostate meds now, and he feels he does not need them  Follow-up BPH, he is no longer taking Flomax, and he stopped finasteride because of concern for effect on erectile function  2   No help with his erections after stopping the ED meds        Cysto in the past showed a quite large prostate with friable mucosa      Nocturia times 1-2, no gross hematuria infections     PSA 1 7 in February 2023  3 3 in March 2022  1 8 in February 2021  2 0 in July 2020  1 7 in July 2019    3  Flank pain, bilateral CVA, perhaps more significant on the right  No radiation to the front   's [address] in [facility] 2022 [address] suggested bilateral small nonobstructing stones, bilateral pelviectasis but no hydro  ASSESSMENT / PLAN:    #1 CT scan to further evaluate, my impression that sonogram was not very reliable for size and number of stones    2  I doubt the bilateral pain is related to the kidneys, but we will see what the CT shows    3  He will ask cardiology if he could try sildenafil again, he is on isosorbide  However, has never taken nitroglycerin tablets    The following portions of the patient's history were reviewed and updated as appropriate: allergies, current medications, past family history, past medical history, past social history, past surgical history and problem list     Review of Systems   All other systems reviewed and are negative  Objective:     Physical Exam  Genitourinary:     Comments: Penis testes normal    Prostate minimally enlarged no nodules          0   Lab Value Date/Time    PSA 1 7 07/02/2019 0926   ]  No results found for: BUN  No results found for: CREATININE  No components found for: CBC      There is no problem list on file for this patient         Diagnoses and all orders for this visit:    Calculus of kidney  -     CT abdomen pelvis wo contrast; Future    Flank pain    Other orders  -     isosorbide mononitrate (IMDUR) 30 mg 24 hr tablet; Take 60 mg by mouth daily  -     Clenpiq 10-3 5-12 MG-GM -GM/160ML SOLN; Use as directed (Patient not taking: Reported on 2/16/2023)           Patient ID: Miguel Carbone is a 74 y o  male  Current Outpatient Medications:   •  acetaminophen (TYLENOL) 500 mg tablet, Take 500 mg by mouth every 6 (six) hours as needed, Disp: , Rfl:   •  albuterol (2 5 mg/3 mL) 0 083 % nebulizer solution, TAKE 3 ML (2 5 MG TOTAL) BY NEBULIZATION EVERY 4 (FOUR) HOURS AS NEEDED FOR WHEEZING , Disp: , Rfl: 11  •  alfuzosin (UROXATRAL) 10 mg 24 hr tablet, Take 1 tablet (10 mg total) by mouth daily, Disp: 90 tablet, Rfl: 3  •  amitriptyline (ELAVIL) 50 mg tablet, amitriptyline 50 mg tablet  TAKE 1 TABLET DAILY IN THE EVENING, Disp: , Rfl:   •  ascorbic acid (VITAMIN C) 1500 MG TBCR, Take by mouth, Disp: , Rfl:   •  aspirin 81 MG tablet, Take 1 tablet by mouth daily, Disp: , Rfl:   •  atorvastatin (LIPITOR) 20 mg tablet, Take 20 mg by mouth 3 (three) times a week, Disp: , Rfl: 1  •  Cholecalciferol 5000 units TABS, Take by mouth, Disp: , Rfl:   •  clopidogrel (PLAVIX) 75 mg tablet, TAKE 1 TABLET DAILY **PLEASE CALL FOR APPOINTMENT, Disp: , Rfl:   •  DEXILANT 30 MG capsule, Take 1 capsule by mouth daily, Disp: , Rfl: 6  •  DIOVAN 80 MG tablet, Take 80 mg by mouth daily, Disp: , Rfl: 0  •  ezetimibe (ZETIA) 10 mg tablet, Zetia 10 mg tablet, Disp: , Rfl:   •  fexofenadine (ALLEGRA) 180 MG tablet, TAKE 1 TABLET (180 MG TOTAL) BY MOUTH DAILY  , Disp: , Rfl: 3  •  fluticasone (FLONASE) 50 mcg/act nasal spray, , Disp: , Rfl:   •  gabapentin (NEURONTIN) 300 mg capsule, Take 300 mg by mouth, Disp: , Rfl:   •  isosorbide mononitrate (IMDUR) 30 mg 24 hr tablet, Take 60 mg by mouth daily, Disp: , Rfl:   •  latanoprost (XALATAN) 0 005 % ophthalmic solution, INSTILL 1 DROP INTO BOTH EYES AT BEDTIME, Disp: , Rfl:   •  losartan (COZAAR) 25 mg tablet, losartan 25 mg tablet  TAKE 1 TABLET DAILY, Disp: , Rfl:   • metFORMIN (GLUCOPHAGE) 500 mg tablet, Take 1 tablet by mouth 2 (two) times a day, Disp: , Rfl:   •  metoprolol succinate (TOPROL-XL) 25 mg 24 hr tablet, TAKE 1 TABLET (25 MG TOTAL) BY MOUTH DAILY  , Disp: , Rfl:   •  mirtazapine (REMERON) 15 mg tablet, TAKE 1 TABLET BY MOUTH EVERY DAY AT NIGHT, Disp: , Rfl:   •  Multiple Vitamin (DAILY VALUE MULTIVITAMIN) TABS, Take 1 tablet by mouth daily, Disp: , Rfl:   •  traMADol (ULTRAM) 50 mg tablet, TAKE 1 TABLET EVERY 8 HOURS AS NEEDED FOR MODERATE PAIN, Disp: , Rfl: 2  •  valsartan-hydrochlorothiazide (DIOVAN-HCT) 80-12 5 MG per tablet, Diovan HCT 80 mg-12 5 mg tablet, Disp: , Rfl:   •  betaxolol (BETOPTIC) 0 5 % ophthalmic solution, betaxolol 0 5 % eye drops (Patient not taking: Reported on 2/16/2023), Disp: , Rfl:   •  bimatoprost (LUMIGAN) 0 01 % ophthalmic drops, Lumigan 0 01 % eye drops (Patient not taking: Reported on 4/25/2022), Disp: , Rfl:   •  brimonidine (ALPHAGAN P) 0 15 % ophthalmic solution, brimonidine 0 15 % eye drops (Patient not taking: Reported on 4/25/2022), Disp: , Rfl:   •  celecoxib (CELEBREX) 200 mg capsule, Celebrex 200 mg capsule (Patient not taking: Reported on 4/25/2022), Disp: , Rfl:   •  chlorhexidine (PERIDEX) 0 12 % solution, RINSE MOUTH WITH 15ML (1 CAPFUL) FOR 30 SECONDS IN MORNING AND EVENING AFTER BRUSHING, THEN SPIT (Patient not taking: Reported on 2/16/2023), Disp: , Rfl:   •  citalopram (CeleXA) 10 mg tablet, Take 10 mg by mouth daily (Patient not taking: Reported on 4/25/2022 ), Disp: , Rfl: 0  •  Clenpiq 10-3 5-12 MG-GM -GM/160ML SOLN, Use as directed (Patient not taking: Reported on 2/16/2023), Disp: , Rfl:   •  Diclofenac Sodium (VOLTAREN) 1 %, diclofenac 1 % topical gel  prn (Patient not taking: Reported on 2/16/2023), Disp: , Rfl:   •  finasteride (PROSCAR) 5 mg tablet, Take 1 tablet (5 mg total) by mouth daily (Patient not taking: Reported on 2/16/2023), Disp: 90 tablet, Rfl: 3  •  FISH OIL-CHOLECALCIFEROL PO, Take by mouth (Patient not taking: Reported on 4/25/2022 ), Disp: , Rfl:   •  fluconazole (DIFLUCAN) 50 mg tablet, Take by mouth (Patient not taking: Reported on 4/25/2022 ), Disp: , Rfl:   •  hydrOXYzine HCL (ATARAX) 10 mg tablet, TAKE ONE TABLET BY MOUTH RIGHT BEFORE BEDTIME (Patient not taking: Reported on 4/25/2022), Disp: , Rfl:   •  ibuprofen (MOTRIN) 600 mg tablet, ibuprofen 600 mg tablet  TAKE 1 TABLET EVERY 6 HOURS AS NEEDED FOR MILD,PAIN (PAIN SCORE 1-3) (Patient not taking: Reported on 4/25/2022), Disp: , Rfl:   •  ipratropium (ATROVENT) 0 06 % nasal spray, ipratropium bromide 42 mcg (0 06 %) nasal spray  USE 1-2 SPRAYS IN EACH NOSTRIL UPTO 4 TIMES A DAY (Patient not taking: Reported on 2/16/2023), Disp: , Rfl:   •  Loratadine 10 MG CAPS, Take by mouth (Patient not taking: Reported on 2/16/2023), Disp: , Rfl:   •  melatonin 3 mg, Take 1 5 mg by mouth (Patient not taking: Reported on 4/25/2022 ), Disp: , Rfl:   •  meloxicam (MOBIC) 7 5 mg tablet, meloxicam 7 5 mg tablet  TAKE 1 TABLET EVERY DAY (Patient not taking: Reported on 4/25/2022), Disp: , Rfl:   •  metaxalone (SKELAXIN) 800 mg tablet, every 8 (eight) hours (Patient not taking: Reported on 4/25/2022 ), Disp: , Rfl:   •  methocarbamol (ROBAXIN) 750 mg tablet, methocarbamol 750 mg tablet  TAKE 1 TABLET BY MOUTH EVERY 6 TO 8 HOURS AS NEEDED (Patient not taking: Reported on 4/25/2022), Disp: , Rfl:   •  montelukast (SINGULAIR) 10 mg tablet, Singulair 10 mg tablet (Patient not taking: Reported on 4/25/2022), Disp: , Rfl:   •  nabumetone (RELAFEN) 750 mg tablet, Every 12 hours (Patient not taking: Reported on 4/25/2022 ), Disp: , Rfl:   •  pantoprazole (PROTONIX) 40 mg tablet, Take 40 mg by mouth 2 (two) times a day (Patient not taking: Reported on 2/16/2023), Disp: , Rfl: 1  •  sildenafil (REVATIO) 20 mg tablet, Take 1 tablet (20 mg total) by mouth daily as needed (As directed) Take 1-5 pills 1-2 hours before each episode (Patient not taking: Reported on 4/25/2022 ), Disp: 90 tablet, Rfl: 3  •  simvastatin (ZOCOR) 10 mg tablet, simvastatin 10 mg tablet (Patient not taking: Reported on 4/25/2022), Disp: , Rfl:   •  SPIRIVA RESPIMAT 2 5 MCG/ACT AERS inhaler, Inhale 2 puffs daily (Patient not taking: Reported on 4/25/2022 ), Disp: , Rfl: 11  •  sucralfate (CARAFATE) 1 g/10 mL suspension, sucralfate 100 mg/mL oral suspension (Patient not taking: Reported on 2/16/2023), Disp: , Rfl:   •  tadalafil (CIALIS) 5 MG tablet, Cialis 5 mg tablet  TK 1 T PO QD UTD (Patient not taking: Reported on 4/25/2022), Disp: , Rfl:   •  traZODone (DESYREL) 50 mg tablet, TAKE 1 TABLET BY MOUTH EVERY DAY AT NIGHT (Patient not taking: Reported on 4/25/2022), Disp: , Rfl:   •  venlafaxine 75 mg 24 hr tablet, TAKE 1 TABLET (75 MG TOTAL) BY MOUTH DAILY WITH BREAKFAST  (Patient not taking: Reported on 4/25/2022), Disp: , Rfl: 3  •  zolpidem (AMBIEN) 5 mg tablet, TAKE 1 TABLET DAILY AS NEEDED FOR SLEEP   (Patient not taking: Reported on 4/25/2022), Disp: , Rfl: 1    Past Medical History:   Diagnosis Date   • Bladder pain    • BPH with obstruction/lower urinary tract symptoms    • BPH without obstruction/lower urinary tract symptoms    • Erectile dysfunction    • Frequency of urination    • Gross hematuria    • Interstitial cystitis    • Interstitial cystitis (chronic) with hematuria        Past Surgical History:   Procedure Laterality Date   • Garrett Gilmore [id number]       Social History

## 2023-02-22 ENCOUNTER — TELEPHONE (OUTPATIENT)
Dept: UROLOGY | Facility: MEDICAL CENTER | Age: 73
End: 2023-02-22

## 2023-02-22 NOTE — TELEPHONE ENCOUNTER
----- Message from Manish Spring MD sent at 2/21/2023  5:24 PM EST -----  Call pt, normal results    LVM of PSA normal results, as per Dr Eladio Villalta notes   PSA at 1 70

## 2023-10-31 ENCOUNTER — TELEPHONE (OUTPATIENT)
Age: 73
End: 2023-10-31

## 2023-10-31 DIAGNOSIS — N40.1 ENLARGED PROSTATE WITH URINARY OBSTRUCTION: Primary | ICD-10-CM

## 2023-10-31 DIAGNOSIS — N13.8 ENLARGED PROSTATE WITH URINARY OBSTRUCTION: Primary | ICD-10-CM

## 2023-10-31 NOTE — TELEPHONE ENCOUNTER
check with pt if taking this rx. per sanket's note from february pt no longer taking bph meds felt ok without them

## 2023-10-31 NOTE — TELEPHONE ENCOUNTER
Reason for call:   [x] Refill   [] Prior Auth  [x] Other: Medication is not on patients Current Medication list please review    Office:   [] PCP/Provider -   [x] Specialty/Provider - Jv Wright    Medication: Alfuzosin    Dose/Frequency: 10mg    Quantity: 90    Pharmacy: [address]    Does the patient have enough for 3 days?    [x] Yes   [] No - Send as HP to POD

## 2023-11-01 NOTE — TELEPHONE ENCOUNTER
Call placed to patient. He did not answer. LMOM asking him to contact the office to clarify if he has been taking this medication. Number was provided for patient to call back and discuss. ,

## 2023-11-06 RX ORDER — ALFUZOSIN HYDROCHLORIDE 10 MG/1
10 TABLET, EXTENDED RELEASE ORAL DAILY
Qty: 90 TABLET | Refills: 3 | Status: SHIPPED | OUTPATIENT
Start: 2023-11-06

## 2023-11-06 NOTE — TELEPHONE ENCOUNTER
Call placed to patient. Spoke with him and informed him of the AP recommendations. Pt verbalized understanding and had no further questions at this time.

## 2023-11-06 NOTE — TELEPHONE ENCOUNTER
Reason for call:   [] Refill   [] Prior Auth  [x] Other:   Pt returned call. Alfuzosin is one of his current medication and is still using. Pt states there must have been a missed communication bc Dr Mahsa Duckworth did not take him off the medication. He do recalling discussing another medication that he stopped but its not alfuzosin and he do not remember the name of it  . Pt also stated he do not have any alfuzosin left.

## 2024-11-07 DIAGNOSIS — N13.8 ENLARGED PROSTATE WITH URINARY OBSTRUCTION: ICD-10-CM

## 2024-11-07 DIAGNOSIS — N40.1 ENLARGED PROSTATE WITH URINARY OBSTRUCTION: ICD-10-CM

## 2024-11-07 RX ORDER — ALFUZOSIN HYDROCHLORIDE 10 MG/1
10 TABLET, EXTENDED RELEASE ORAL DAILY
Qty: 90 TABLET | Refills: 0 | Status: SHIPPED | OUTPATIENT
Start: 2024-11-07

## 2025-02-04 DIAGNOSIS — N40.1 ENLARGED PROSTATE WITH URINARY OBSTRUCTION: ICD-10-CM

## 2025-02-04 DIAGNOSIS — N13.8 ENLARGED PROSTATE WITH URINARY OBSTRUCTION: ICD-10-CM

## 2025-02-04 RX ORDER — ALFUZOSIN HYDROCHLORIDE 10 MG/1
10 TABLET, EXTENDED RELEASE ORAL DAILY
Qty: 90 TABLET | Refills: 1 | Status: SHIPPED | OUTPATIENT
Start: 2025-02-04